# Patient Record
Sex: FEMALE | Race: WHITE | URBAN - METROPOLITAN AREA
[De-identification: names, ages, dates, MRNs, and addresses within clinical notes are randomized per-mention and may not be internally consistent; named-entity substitution may affect disease eponyms.]

---

## 2017-03-23 ENCOUNTER — TRANSFERRED RECORDS (OUTPATIENT)
Dept: HEALTH INFORMATION MANAGEMENT | Facility: CLINIC | Age: 57
End: 2017-03-23

## 2017-04-18 ENCOUNTER — TRANSFERRED RECORDS (OUTPATIENT)
Dept: HEALTH INFORMATION MANAGEMENT | Facility: CLINIC | Age: 57
End: 2017-04-18

## 2017-11-10 ENCOUNTER — TRANSFERRED RECORDS (OUTPATIENT)
Dept: HEALTH INFORMATION MANAGEMENT | Facility: CLINIC | Age: 57
End: 2017-11-10

## 2020-10-13 ENCOUNTER — MEDICAL CORRESPONDENCE (OUTPATIENT)
Dept: HEALTH INFORMATION MANAGEMENT | Facility: CLINIC | Age: 60
End: 2020-10-13

## 2021-02-22 ENCOUNTER — TELEPHONE (OUTPATIENT)
Dept: NEUROLOGY | Facility: CLINIC | Age: 61
End: 2021-02-22

## 2021-02-22 NOTE — TELEPHONE ENCOUNTER
Patient was referred to [facility] for seizures by Dr. Mario with [facility].  Patient does not wish to schedule an appointment at this time.

## 2021-04-02 ENCOUNTER — TELEPHONE (OUTPATIENT)
Dept: NEUROLOGY | Facility: CLINIC | Age: 61
End: 2021-04-02

## 2021-04-02 NOTE — TELEPHONE ENCOUNTER
Received referral from Stoney Mario MD @ [facility].  Referral/consultation to [facility] [place] for epilepsy.    DX:  Seizure          Non-Epileptic events  Call patient to schedule appt at [phone].

## 2021-06-24 NOTE — TELEPHONE ENCOUNTER
Patient's insurance is affiliated with receiving services with [facility].  Declined scheduling an appointment at this time.